# Patient Record
Sex: FEMALE | Race: WHITE | NOT HISPANIC OR LATINO | Employment: OTHER | URBAN - NONMETROPOLITAN AREA
[De-identification: names, ages, dates, MRNs, and addresses within clinical notes are randomized per-mention and may not be internally consistent; named-entity substitution may affect disease eponyms.]

---

## 2024-10-16 ENCOUNTER — OFFICE VISIT (OUTPATIENT)
Dept: OBSTETRICS AND GYNECOLOGY | Facility: CLINIC | Age: 69
End: 2024-10-16
Payer: MEDICARE

## 2024-10-16 VITALS
DIASTOLIC BLOOD PRESSURE: 74 MMHG | TEMPERATURE: 97 F | SYSTOLIC BLOOD PRESSURE: 122 MMHG | HEIGHT: 64 IN | WEIGHT: 153.63 LBS | BODY MASS INDEX: 26.23 KG/M2

## 2024-10-16 DIAGNOSIS — Z12.4 SCREENING FOR MALIGNANT NEOPLASM OF THE CERVIX: ICD-10-CM

## 2024-10-16 DIAGNOSIS — R39.15 URINARY URGENCY: Primary | ICD-10-CM

## 2024-10-16 DIAGNOSIS — Z80.3 FAMILY HISTORY OF BREAST CANCER: ICD-10-CM

## 2024-10-16 DIAGNOSIS — Z12.31 BREAST CANCER SCREENING BY MAMMOGRAM: ICD-10-CM

## 2024-10-16 DIAGNOSIS — Z80.0 FAMILY HISTORY OF COLON CANCER: ICD-10-CM

## 2024-10-16 DIAGNOSIS — Z01.419 ENCOUNTER FOR WELL WOMAN EXAM: ICD-10-CM

## 2024-10-16 DIAGNOSIS — Z80.41 FAMILY HISTORY OF OVARIAN CANCER: ICD-10-CM

## 2024-10-16 PROCEDURE — 87624 HPV HI-RISK TYP POOLED RSLT: CPT | Performed by: OBSTETRICS & GYNECOLOGY

## 2024-10-16 RX ORDER — CLOBETASOL PROPIONATE 0.5 MG/G
OINTMENT TOPICAL 2 TIMES DAILY
COMMUNITY

## 2024-10-16 RX ORDER — MIRABEGRON 25 MG/1
25 TABLET, FILM COATED, EXTENDED RELEASE ORAL DAILY
Qty: 30 TABLET | Refills: 3 | COMMUNITY
Start: 2024-10-16 | End: 2025-10-16

## 2024-10-16 NOTE — PROGRESS NOTES
Chief Complaint:  Well Woman     History of Present Illness:  Nissa Nadya Bertrand is a 69 y.o. year old  presents c/o urinary urge. Denies loss of urine with coughing or sneezing. Menopausal, denies any postmenopausal bleeding.     Family history of ovarian and breast cancer with +BRCA 2. Patient underwent genetic testing in , resulted negative. Maternal grandmother with colon cancer. Brother and father with prostate cancer. States seen Dr. Koch in the last five years and her genetic counselor neg results .    Last colonoscopy , negative polyps    DEXA scan within last 2 years nl        Gyn History:  Menstrual History  Cycle: No  Menarche Age: 1 years  No Cycle Reason: Other  Other Reason: Post Menopausal    Menopause  Menopause Age: 1 years  Post Menopausal Bleeding: No  Hormone Replacement Therapy: No    Pap History  Last pap date: 19 (NIL -HPV)  Result: Normal  History of Abnormal Pap: No  HPV Vaccine Completed: No (0/3)    [place]  Sexually Active: Yes  Sexual Orientation: heterosexual  Postcoital Bleeding: No  Dyspareunia: No  STI History: No  Contraception: No    Breast History  Last Breast Imaging Date: Yes  Date: 19 (Benign)  History of Abnormal Breast Imaging : (!) Yes (19 Breast U/S Benign)  History of Breast Biopsy: Yes  Date of last biopsy: 01/01/10 (Benign per patient)        Review of Systems:  General/Constitutional: Chills denies. Fatigue/weakness denies. Fever denies. Night sweats denies. Hot flashes denies    Respiratory: Cough denies. Hemoptysis denies. SOB denies. Sputum production denies. Wheezing denies .   Cardiovascular: Chest pain denies. Dizziness denies. Palpitations denies. Swelling in hands/feet denies.                Breast: Dimpling denies. Breast mass denies. Breast pain/tenderness denies. Nipple discharge denies. Puckering denies.  Gastrointestinal: Abdominal pain denies. Blood in stool denies. Constipation denies. Diarrhea denies.  Heartburn denies. Nausea denies. Vomiting denies    Genitourinary: Incontinence denies. Blood in urine denies. Frequent urination denies. Painful urination denies. Urinary urgency admits. Nocturia denies    Gynecologic: Irregular menses denies. Heavy bleeding denies. Painful menses denies. Vaginal discharge denies. Vaginal odor denies. Vaginal itching denies. Vaginal lesion denies. Pelvic pain denies. Decreased libido denies. Vulvar lesion denies. Prolapse of genital organs denies. Painful intercourse denies. Postcoital bleeding denies    Psychiatric: Depression denies. Anxiety denies.     OB History    Para Term  AB Living   3 3 3 0 0 3   SAB IAB Ectopic Multiple Live Births   0 0 0 0 3      # 1 - Date: [birth year], Sex: Male, Weight: None, GA: 39w0d, Type: Vaginal, Spontaneous, Apgar1: None, Apgar5: None, Living: Living, Birth Comments: None    # 2 - Date: [birth year], Sex: Male, Weight: None, GA: 39w0d, Type: Vaginal, Spontaneous, Apgar1: None, Apgar5: None, Living: Living, Birth Comments: None    # 3 - Date: 89, Sex: Female, Weight: None, GA: 38w0d, Type: Vaginal, Spontaneous, Apgar1: None, Apgar5: None, Living: Living, Birth Comments: None      History reviewed. No pertinent past medical history.    Current Outpatient Medications:     clobetasol 0.05% (TEMOVATE) 0.05 % Oint, Apply topically 2 (two) times daily., Disp: , Rfl:     Review of patient's allergies indicates:  No Known Allergies    Past Surgical History:   Procedure Laterality Date    ANTERIOR CRUCIATE LIGAMENT REPAIR          ANTERIOR FIXATION AND POSTERIOR MICRODISCECTOMY CERVICAL SPINE      C6-7    BREAST BIOPSY  2010    Benign/patient    CARPAL TUNNEL RELEASE      COLONOSCOPY      2018 Polyps removed    EYE SURGERY  2003    Lasik    TONSILLECTOMY AND ADENOIDECTOMY  1968     Family History   Problem Relation Name Age of Onset    Melanoma Mother Raquel     Prostate cancer Father Alejandro     Stroke Father Alejandro     Breast  "cancer Sister Sheri 39        BRCA2    Breast cancer Sister Stefanie 40        BRCA2    Leukemia Brother Leonardo     Prostate cancer Brother Kenn     Colon cancer Maternal Grandmother      Ovarian cancer Paternal Aunt Darcie Tom    Uterine cancer Neg Hx       Social History     Socioeconomic History    Marital status:    Tobacco Use    Smoking status: Never    Smokeless tobacco: Never   Substance and Sexual Activity    Alcohol use: Yes    Drug use: Never    Sexual activity: Yes     Partners: Male     Birth control/protection: Post-menopausal       Physical Exam:  /74   Temp 97.2 °F (36.2 °C)   Ht 5' 4" (1.626 m)   Wt 69.7 kg (153 lb 9.6 oz)   BMI 26.37 kg/m²     Chaperone: present.       General appearance: healthy, well-nourished and well-developed     Psychiatric: Orientation to time, place and person. Normal mood and affect and active, alert     Skin: Appearance: no rashes or lesions.     Neck:   Neck: supple, FROM, trachea midline. and no masses   Thyroid: no enlargement or nodules and non-tender.       Cardiovascular:   Auscultation: RRR and no murmur.   Peripheral Vascular: no varicosities, LLE edema, RLE edema, calf tenderness, and palpable cord and pedal pulses intact.     Lungs:   Respiratory effort: no intercostal retractions or accessory muscle usage.   Auscultation: no wheezing, rales/crackles, or rhonchi and clear to auscultation.     Breast:   Inspection/Palpation: no tenderness, discrete/distinct masses, skin changes, or abnormal secretions. Nipple appearance normal.     Abdomen:   Auscultation/Inspection/Palpation: no hepatomegaly, splenomegaly, masses, tenderness or CVA tenderness and soft, non-distended bowel sounds preset.    Hernia: no palpable hernias.     Female Genitalia:    Vulva: no masses, tenderness or lesions    Bladder/Urethra: no urethral discharge or mass, normal meatus, bladder non-distended.    Vagina: no tenderness, erythema, cystocele, rectocele, abnormal vaginal " discharge or vesicle(s) or ulcers    Cervix: no discharge, no cervical lacerations noted or motion tenderness and grossly normal    Uterus: normal size and shape and midline, non-tender, and no uterine prolapse.    Adnexa/Parametria: no parametrial tenderness or mass, no adnexal tenderness or ovarian mass.     Lymph Nodes:   Palpation: non tender submandibular nodes, axillary nodes, or inguinal nodes.     Rectal Exam:   Rectum: normal perianal skin.         Assessment/Plan:  1. Urinary urgency  Discussed history and findings as well as multiple etiologies for urine loss.     Explained that if symptoms are primarily associated with urgency we often empirically treat them with antispasmodics.     If this treatment fails or if her symptoms are usually related to Valsalva then we recommend urodynamic studies to determine if surgery is her best option.     Also discussed that some patients may benefit from local estrogen.     Rx: Myrbetriq 25 mg  Samples given to patient (LOT [id number], EXP 12/2025) x4   Advised to call if no improvement, refills to be sent if needed     2. Family history of breast cancer,  Family history of ovarian cancer  Educated    Breast cancer in two sisters, both positive for BRCA 2  Breast Cancer-related family history includes Breast cancer (age of onset: 39) in her sister; Breast cancer (age of onset: 40) in her sister.     Patient underwent genetic testing in 2007, resulted negative per pt. Seen by Dr. Koch in past 5 years and underwent re screening. TAMARA     Discussed recommendations of annual screening after age of 40 with mammogram and MRI for patients with lifetime risk greater than 25%       Explained that screening is not 100% reliable. Advised patient if she notices any changes to her breast including a lump, mass, dimpling, discharge, rash, or tenderness she should contact us immediately.       Recommend BSE monthly     TAMARA to Dr. Koch for recent breast imaging  MMG    3. Family  history of colon cancer  Educated  Last colonoscopy 2018, negative polyps  Will call with name of provider she desires referral       Pap   Recommend BSE monthly   Discussed recommendations of annual screening after age of 40 with mammogram  Explained that screening is not 100% reliable. Advised patient if she notices any changes to her breast including a lump, mass, dimpling, discharge, rash, or tenderness she should contact us immediately.     Healthy diet, exercise   MMG  Multivitamin   Seat belt   Sunscreen use   Safe sex/ STI education  Annual labs: w/ PCP, Dr. Kitchen   Colonoscopy: 2018, negative polyps            This note was transcribed by Seema Almanza MA. There may be transcription errors as a result, however minimal. I agree with transcription and every effort has been made to ensure accuracy of transcription, but any obvious errors or omissions should be clarified with the author of the document.

## 2024-10-30 ENCOUNTER — HOSPITAL ENCOUNTER (OUTPATIENT)
Dept: RADIOLOGY | Facility: HOSPITAL | Age: 69
Discharge: HOME | End: 2024-10-30
Attending: OBSTETRICS & GYNECOLOGY
Payer: MEDICARE

## 2024-10-30 DIAGNOSIS — Z12.31 BREAST CANCER SCREENING BY MAMMOGRAM: ICD-10-CM

## 2024-10-30 PROCEDURE — 77067 SCR MAMMO BI INCL CAD: CPT | Mod: TC

## 2024-10-30 PROCEDURE — 77063 BREAST TOMOSYNTHESIS BI: CPT | Mod: TC

## 2025-01-16 ENCOUNTER — TELEPHONE (OUTPATIENT)
Dept: OBSTETRICS AND GYNECOLOGY | Facility: CLINIC | Age: 70
End: 2025-01-16
Payer: MEDICARE

## 2025-01-16 DIAGNOSIS — Z20.828 EXPOSURE TO INFLUENZA: Primary | ICD-10-CM

## 2025-01-16 RX ORDER — OSELTAMIVIR PHOSPHATE 75 MG/1
75 CAPSULE ORAL DAILY
Qty: 10 CAPSULE | Refills: 0 | Status: SHIPPED | OUTPATIENT
Start: 2025-01-16 | End: 2025-01-26

## 2025-01-16 NOTE — TELEPHONE ENCOUNTER
Tamiflu ordered and sent to pharmacy for prophylactic use per CARON Goetz NP due to patient 's exposure to Influenza.

## 2025-02-07 RX ORDER — DOXYCYCLINE 100 MG/1
100 CAPSULE ORAL EVERY 12 HOURS
Qty: 28 CAPSULE | Refills: 0 | Status: SHIPPED | OUTPATIENT
Start: 2025-02-07 | End: 2025-02-21

## 2025-03-03 RX ORDER — METHYLPREDNISOLONE 4 MG/1
TABLET ORAL
Qty: 21 EACH | Refills: 0 | Status: SHIPPED | OUTPATIENT
Start: 2025-03-03 | End: 2025-03-24